# Patient Record
Sex: FEMALE | Race: WHITE | Employment: FULL TIME | URBAN - METROPOLITAN AREA
[De-identification: names, ages, dates, MRNs, and addresses within clinical notes are randomized per-mention and may not be internally consistent; named-entity substitution may affect disease eponyms.]

---

## 2017-09-20 ENCOUNTER — OFFICE VISIT (OUTPATIENT)
Dept: OPTOMETRY | Facility: CLINIC | Age: 57
End: 2017-09-20
Payer: COMMERCIAL

## 2017-09-20 DIAGNOSIS — H43.812 POSTERIOR VITREOUS DETACHMENT, LEFT EYE: Primary | ICD-10-CM

## 2017-09-20 PROCEDURE — 99202 OFFICE O/P NEW SF 15 MIN: CPT | Performed by: OPTOMETRIST

## 2017-09-20 ASSESSMENT — REFRACTION_MANIFEST
OS_AXIS: 155
METHOD_AUTOREFRACTION: 1
OD_SPHERE: +0.50
OD_CYLINDER: +0.25
OS_SPHERE: +0.50
OS_CYLINDER: +1.75
OD_AXIS: 026

## 2017-09-20 ASSESSMENT — SLIT LAMP EXAM - LIDS
COMMENTS: NORMAL
COMMENTS: NORMAL

## 2017-09-20 ASSESSMENT — VISUAL ACUITY
METHOD: SNELLEN - LINEAR
OS_SC: 20/100
OD_SC: 20/20
OD_SC+: -1
OS_PH_SC: 20/50

## 2017-09-20 ASSESSMENT — TONOMETRY
OD_IOP_MMHG: 12
OS_IOP_MMHG: 13
IOP_METHOD: APPLANATION

## 2017-09-20 ASSESSMENT — CUP TO DISC RATIO
OS_RATIO: 0.2
OD_RATIO: 0.2

## 2017-09-20 ASSESSMENT — EXTERNAL EXAM - RIGHT EYE: OD_EXAM: NORMAL

## 2017-09-20 ASSESSMENT — EXTERNAL EXAM - LEFT EYE: OS_EXAM: NORMAL

## 2017-09-20 NOTE — PATIENT INSTRUCTIONS
Signs of a retinal detachment include: a shower of dark spots, or  a shade or curtain coming across vision,  or flashing lights that seem to come and go through out the daylight.     If these occur call ASAP to make appointment to evaluate if there is a retinal detachment.    Return to clinic 1 year eye exam.              Melania Lizarraga O.D.  [facility]   [id number] [place] [address] [id number] [phone]

## 2017-09-20 NOTE — MR AVS SNAPSHOT
"              After Visit Summary   9/20/2017    Bambi Gillespie    MRN: [id number]           Patient Information     Date Of Birth          1960        Visit Information        Provider Department      9/20/2017 11:20 AM Melania Lizarraga, [facility]        Today's Diagnoses     Posterior vitreous detachment, left eye    -  1      Care Instructions    Signs of a retinal detachment include: a shower of dark spots, or  a shade or curtain coming across vision,  or flashing lights that seem to come and go through out the daylight.     If these occur call ASAP to make appointment to evaluate if there is a retinal detachment.    Return to clinic 1 year eye exam.              Melania Lizarraga O.D.  [facility]   [address] [place] [address]  [phone]              Follow-ups after your visit        Who to contact     If you have questions or need follow up information about today's clinic visit or your schedule please contact [facility] directly at [phone].  Normal or non-critical lab and imaging results will be communicated to you by [employer], letter or phone within 4 business days after the clinic has received the results. If you do not hear from us within 7 days, please contact the clinic through [employer] or phone. If you have a critical or abnormal lab result, we will notify you by phone as soon as possible.  Submit refill requests through "[employer] or call your pharmacy and they will forward the refill request to us. Please allow 3 business days for your refill to be completed.          Additional Information About Your Visit        [employer] Information     "[employer] lets you send messages to your doctor, view your test results, renew your prescriptions, schedule appointments and more. To sign up, go to [email or web address] . Click on \"Log in\" on the left side of the screen, which will take you to the Welcome page. Then click on \"Sign up Now\" on the right side " of the page.     You will be asked to enter the access code listed below, as well as some personal information. Please follow the directions to create your username and password.     Your access code is: [email or web address]  Expires: 2017  1:08 PM     Your access code will  in 90 days. If you need help or a new code, please call your [facility] clinic or [phone].        Care EveryWhere ID     This is your Care EveryWhere ID. This could be used by other organizations to access your [facility] medical records  [id number]         Blood Pressure from Last 3 Encounters:   16 109/73   16 118/76   10/22/14 97/67    Weight from Last 3 Encounters:   16 53.2 kg (117 lb 3.2 oz)   16 52.5 kg (115 lb 12.8 oz)   10/22/14 54.8 kg (120 lb 12.8 oz)              Today, you had the following     No orders found for display       Primary Care Provider Office Phone #    [facility] [phone]       No address on file        Equal Access to Services     RONALD BERRY : Hadii flaco ku hadhenrryo Soarabella, waaxda luqadaha, qaybta kaalmada eric, stefania bush . So [facility] [phone].    ATENCIÓN: Si habla español, tiene a coles disposición servicios gratuitos de asistencia lingüística. Llame al [phone].    We comply with applicable federal civil rights laws and [place] laws. We do not discriminate on the basis of race, color, national origin, age, disability sex, sexual orientation or gender identity.            Thank you!     Thank you for choosing [facility]  [facility]. Our goal is always to provide you with excellent care. Hearing back from our patients is one way we can continue to improve our services. Please take a few minutes to complete the written survey that you may receive in the mail after your visit with us. Thank you!             Your Updated Medication List - Protect others around you: Learn how to safely use, store and throw away your  medicines at www.disposemymeds.org.          This list is accurate as of: 9/20/17  1:08 PM.  Always use your most recent med list.                   Brand Name Dispense Instructions for use Diagnosis    LEXAPRO PO      Take 20 mg by mouth daily

## 2017-09-20 NOTE — PROGRESS NOTES
"Chief Complaint   Patient presents with     Eye Problem Left Eye     possible scratch     HPI    Affected eye(s):  Left   Symptoms:     Distorted vision      Duration:  5 days   Frequency:  Constant       Do you have eye pain now?:  No      Comments:  Patient said that she was doing some gardening when she was swarmed by gnats. She was rubbing and wiping at her eyes and it feels like she has a scratch on her eye.  She used allergy drops but it hasn't worked.  She denies pain, and said that it's just bothering her vision. No foreign body sensation  Patient also said that when it's dark she's noticed a \"string or scratch\" but it looks like a light.   She denied seeing any flashes of light.  Like eyelash blocking vision of left eye superiorly-  Firefly flashes when in dark room in temporal vision  Last eye exam 2 yrs ago  Has always avoided night driving due to vision                       HPI and ROS performed by Melania Lizarraga, OD  scribed by Rosa Maria Apple Optometric Assistant       See Review Of Systems     Medical, surgical and family histories reviewed and updated 9/20/2017.         OBJECTIVE: See Ophthalmology exam    ASSESSMENT:    ICD-10-CM    1. Posterior vitreous detachment, left eye H43.812       PLAN:  reassured retina healthy- need to monitor for changes  Patient Instructions   Signs of a retinal detachment include: a shower of dark spots, or  a shade or curtain coming across vision,  or flashing lights that seem to come and go through out the daylight.     If these occur call ASAP to make appointment to evaluate if there is a retinal detachment.    Return to clinic 1 year eye exam.              Melania Lizarraga O.D.  [facility]   [id number] [place] [id number]  [phone]         "

## 2018-01-10 ENCOUNTER — TRANSFERRED RECORDS (OUTPATIENT)
Dept: HEALTH INFORMATION MANAGEMENT | Facility: CLINIC | Age: 58
End: 2018-01-10

## 2018-01-21 ENCOUNTER — TRANSFERRED RECORDS (OUTPATIENT)
Dept: HEALTH INFORMATION MANAGEMENT | Facility: CLINIC | Age: 58
End: 2018-01-21

## 2018-12-31 ENCOUNTER — OFFICE VISIT (OUTPATIENT)
Dept: FAMILY MEDICINE | Facility: CLINIC | Age: 58
End: 2018-12-31
Payer: COMMERCIAL

## 2018-12-31 VITALS
SYSTOLIC BLOOD PRESSURE: 123 MMHG | WEIGHT: 112 LBS | BODY MASS INDEX: 18.66 KG/M2 | HEART RATE: 79 BPM | OXYGEN SATURATION: 96 % | RESPIRATION RATE: 16 BRPM | HEIGHT: 65 IN | TEMPERATURE: 98 F | DIASTOLIC BLOOD PRESSURE: 85 MMHG

## 2018-12-31 DIAGNOSIS — J01.90 ACUTE SINUSITIS WITH SYMPTOMS > 10 DAYS: Primary | ICD-10-CM

## 2018-12-31 PROCEDURE — 99213 OFFICE O/P EST LOW 20 MIN: CPT | Performed by: NURSE PRACTITIONER

## 2018-12-31 ASSESSMENT — MIFFLIN-ST. JEOR: SCORE: 1078.91

## 2018-12-31 ASSESSMENT — PAIN SCALES - GENERAL: PAINLEVEL: MODERATE PAIN (4)

## 2018-12-31 NOTE — PROGRESS NOTES
"  SUBJECTIVE:   Bambi Gillespie is a 58 year old female who presents to clinic today for the following health issues:      RESPIRATORY SYMPTOMS      Duration: 2 weeks    Description  nasal congestion, rhinorrhea, facial pain/pressure, cough, ear pain bilateral and headache    Severity: moderate    Accompanying signs and symptoms: None    History (predisposing factors):  none    Precipitating or alleviating factors: None    Therapies tried and outcome:  none      Problem list and histories reviewed & adjusted, as indicated.  Additional history: as documented    Patient Active Problem List   Diagnosis     Advanced directives, counseling/discussion     Posterior vitreous detachment, left eye     History reviewed. No pertinent surgical history.    Social History     Tobacco Use     Smoking status: Never Smoker     Smokeless tobacco: Never Used   Substance Use Topics     Alcohol use: Yes     Family History   Problem Relation Age of Onset     Glaucoma No family hx of      Macular Degeneration No family hx of            Reviewed and updated as needed this visit by clinical staff  Tobacco  Allergies  Meds  Med Hx  Surg Hx  Fam Hx  Soc Hx      Reviewed and updated as needed this visit by Provider         ROS:  CONSTITUTIONAL: NEGATIVE for fever, chills, change in weight  ENT/MOUTH: NEGATIVE for ear, mouth and throat problems  RESP: NEGATIVE for significant cough or SOB  CV: NEGATIVE for chest pain, palpitations or peripheral edema    OBJECTIVE:     /85   Pulse 79   Temp 98  F (36.7  C) (Oral)   Resp 16   Ht 1.651 m (5' 5\")   Wt 50.8 kg (112 lb)   SpO2 96%   Breastfeeding? No   BMI 18.64 kg/m    Body mass index is 18.64 kg/m .  GENERAL:alert and no distress  EYES: Eyes grossly normal to inspection, PERRL and conjunctivae and sclerae normal  HENT: ear canals and TM's normal, nose and mouth without ulcers or lesions, frontal tenderness  NECK: no adenopathy, no asymmetry, masses, or scars and thyroid normal to " palpation  RESP: lungs clear to auscultation - no rales, rhonchi or wheezes  CV: regular rate and rhythm, normal S1 S2, no S3 or S4, no murmur, click or rub, no peripheral edema and peripheral pulses strong  SKIN: no suspicious lesions or rashes      Diagnostic Test Results:  none     ASSESSMENT/PLAN:     1. Acute sinusitis with symptoms > 10 days    - amoxicillin-clavulanate (AUGMENTIN) 875-125 MG tablet; Take 1 tablet by mouth 2 times daily for 10 days  Dispense: 20 tablet; Refill: 0    See Patient Instructions    MADISON Fish [facility]

## 2019-01-07 ENCOUNTER — TELEPHONE (OUTPATIENT)
Dept: FAMILY MEDICINE | Facility: CLINIC | Age: 59
End: 2019-01-07

## 2019-01-07 NOTE — TELEPHONE ENCOUNTER
"Augmentin is typically a good medication for sinus infection  There is nothing \"stronger\" and zpaks are not recommended for sinus infections  If symptoms are same or worse she should be seen again in clinic    Meenakshi Irizarry, APRN CNP    "

## 2019-01-07 NOTE — TELEPHONE ENCOUNTER
Reason for call: Per patient: the medication(amoxicillin-clavulanate (AUGMENTIN) 875-125 MG tablet) isn't working, I've been taking it for 7 days and is it possible to order a z-tac or something a little stronger.    Pharmacy: [facility] the [facility] in [place]    Phone number to reach patient:  Cell number on file:    Telephone Information:   Mobile [phone]       Best Time:  Anytime    Can we leave a detailed message on this number?  YES

## 2019-01-09 ENCOUNTER — TELEPHONE (OUTPATIENT)
Dept: FAMILY MEDICINE | Facility: CLINIC | Age: 59
End: 2019-01-09

## 2019-01-09 NOTE — TELEPHONE ENCOUNTER
Patient was informed of Meenakshi Irizarry's message, see will finish her medication and decide if she wants to be seen tomorrow.  Jessi Adair MA

## 2019-01-09 NOTE — TELEPHONE ENCOUNTER
Patient is calling again regarding the medication that was given to her is not working.  Please call to advise.  Patient states that she has not received a call back from the previous message that she left on 1/7/2019.  Thank you

## 2019-02-11 ENCOUNTER — THERAPY VISIT (OUTPATIENT)
Dept: PHYSICAL THERAPY | Facility: CLINIC | Age: 59
End: 2019-02-11
Payer: COMMERCIAL

## 2019-02-11 DIAGNOSIS — M25.551 HIP PAIN, RIGHT: ICD-10-CM

## 2019-02-11 DIAGNOSIS — M25.552 HIP PAIN, LEFT: Primary | ICD-10-CM

## 2019-02-11 DIAGNOSIS — M67.959 TENDINOPATHY OF GLUTEAL REGION: ICD-10-CM

## 2019-02-11 PROCEDURE — 97161 PT EVAL LOW COMPLEX 20 MIN: CPT | Mod: GP | Performed by: PHYSICAL THERAPIST

## 2019-02-11 PROCEDURE — 97110 THERAPEUTIC EXERCISES: CPT | Mod: GP | Performed by: PHYSICAL THERAPIST

## 2019-02-11 NOTE — LETTER
[facility]  [address]  [place] [address]  [phone]    2019    Re: Bambi Gillespie   :   1960  MRN:  [id number]   REFERRING PHYSICIAN:   [facility]    Date of Initial Evaluation:  19  Visits:  Rxs Used: 1  Reason for Referral:     Hip pain, left  Hip pain, right  Tendinopathy of gluteal region    [facility] Initial Evaluation    Subjective:  The history is provided by the patient. No  was used.   Bambi Gillespie is a 58 year old female with a bilateral hips condition.  Condition occurred with:  Insidious onset and degenerative joint disease.  Condition occurred: for unknown reasons.  This is a chronic condition  19.    Patient reports pain:  Posterior.  Radiates to:  Gluteals.  Pain is described as aching and sharp and is intermittent and reported as 6/10.  Associated symptoms:  Loss of motion/stiffness and loss of strength. Pain is the same all the time.  Symptoms are exacerbated by ascending stairs, bending/squatting, descending stairs, running and walking and relieved by heat, ice and rest.  Since onset symptoms are unchanged.  Special tests:  X-ray and MRI.  Previous treatment includes physical therapy, chiropractic and other (massage ).  There was mild improvement following previous treatment.  General health as reported by patient is excellent.  Pertinent medical history includes:  None.  Medical allergies: no.  Other surgeries include:  None reported.  Current medications:  None as reported by the patient.  Current occupation is  .  Patient is working in normal job without restrictions.  Primary job tasks include:  Prolonged sitting, prolonged standing, repetitive tasks and lifting.  Barriers include:  None as reported by the patient.  Red flags:  None as reported by the patient.    Objective:  Standing Alignment:    Cervical/Thoracic:  Forward  head  Shoulder/UE:  Rounded shoulders    Gait:    Gait Type:  Antalgic   Weight Bearing Status:  WBAT   Assistive Devices:  None    Flexibility/Screens:   Positive screens:  Lumbar      Lower Extremity:  Decreased left lower extremity flexibility:Hip IR's; Hip ER's; Piriformis and Hip Flexors  Decreased right lower extremity flexibility:  Hip IR's; Hip ER's; Piriformis and Hip Flexors  Neurological: She is alert. She has normal strength and normal reflexes. No cranial nerve deficit or sensory deficit.     Hip Evaluation  Hip PROM:    Flexion: Left: 120   Right: 120  Internal Rotation: Left: 45    Right: 45  External Rotation: Left: 75    Right: 60    Hip Strength:  : weak and painful ER and IR bilaterally , core strength deminished as well as hip abductor strength     Hip Special Testing:    Left hip positive for the following special tests:  Kevin  Right hip positive for the following special tests:  Kevin      Hip Palpation:  Not Assessed     Assessment/Plan:    Patient is a 58 year old female with both sides hip complaints.    Patient has the following significant findings with corresponding treatment plan.                Diagnosis 1:   Bilateral Hip Pain   Pain -  hot/cold therapy  Decreased ROM/flexibility - manual therapy and therapeutic exercise  Decreased strength - therapeutic exercise and therapeutic activities    Therapy Evaluation Codes:   1) History comprised of:   Personal factors that impact the plan of care:      Time since onset of symptoms.    Comorbidity factors that impact the plan of care are:      None.     Medications impacting care: None.  2) Examination of Body Systems comprised of:   Body structures and functions that impact the plan of care:      Hip.   Activity limitations that impact the plan of care are:      Driving, Lifting, Sitting, Squatting/kneeling, Stairs and Walking.  3) Clinical presentation characteristics are:   Stable/Uncomplicated.  4) Decision-Making    Low complexity  using standardized patient assessment instrument and/or measureable assessment of functional outcome.    Cumulative Therapy Evaluation is: Low complexity.  Previous and current functional limitations:  (See Goal Flow Sheet for this information)    Short term and Long term goals: (See Goal Flow Sheet for this information)   Communication ability:  Patient appears to be able to clearly communicate and understand verbal and written communication and follow directions correctly.  Treatment Explanation - The following has been discussed with the patient:   RX ordered/plan of care  Anticipated outcomes  Possible risks and side effects  This patient would benefit from PT intervention to resume normal activities.   Rehab potential is good.    Frequency:  1 X week, once daily  Duration:  for 12 weeks  Discharge Plan:  Achieve all LTG.  Independent in home treatment program.  Reach maximal therapeutic benefit.    Thank you for your referral.    INQUIRIES  Therapist: Tom Kenney, PT, ATC, Cert. [facility]  [address] [place] [address]  Phone: [phone]  Fax: [phone]

## 2019-02-17 NOTE — PROGRESS NOTES
[facility] Initial Evaluation  Subjective:  The history is provided by the patient. No  was used.   Bambi Gillespie is a 58 year old female with a bilateral hips condition.  Condition occurred with:  Insidious onset and degenerative joint disease.  Condition occurred: for unknown reasons.  This is a chronic condition  1/19/19.    Patient reports pain:  Posterior.  Radiates to:  Gluteals.  Pain is described as aching and sharp and is intermittent and reported as 6/10.  Associated symptoms:  Loss of motion/stiffness and loss of strength. Pain is the same all the time.  Symptoms are exacerbated by ascending stairs, bending/squatting, descending stairs, running and walking and relieved by heat, ice and rest.  Since onset symptoms are unchanged.  Special tests:  X-ray and MRI.  Previous treatment includes physical therapy, chiropractic and other (massage ).  There was mild improvement following previous treatment.  General health as reported by patient is excellent.  Pertinent medical history includes:  None.  Medical allergies: no.  Other surgeries include:  None reported.  Current medications:  None as reported by the patient.  Current occupation is  .  Patient is working in normal job without restrictions.  Primary job tasks include:  Prolonged sitting, prolonged standing, repetitive tasks and lifting.    Barriers include:  None as reported by the patient.    Red flags:  None as reported by the patient.                        Objective:  Standing Alignment:    Cervical/Thoracic:  Forward head  Shoulder/UE:  Rounded shoulders              Gait:    Gait Type:  Antalgic   Weight Bearing Status:  WBAT   Assistive Devices:  None      Flexibility/Screens:   Positive screens:  Lumbar    Lower Extremity:  Decreased left lower extremity flexibility:Hip IR's; Hip ER's; Piriformis and Hip Flexors    Decreased right lower extremity flexibility:  Hip IR's; Hip ER's; Piriformis  and Hip Flexors      Neurological: She is alert. She has normal strength and normal reflexes. No cranial nerve deficit or sensory deficit.                                              Hip Evaluation  Hip PROM:    Flexion: Left: 120   Right: 120        Internal Rotation: Left: 45    Right: 45  External Rotation: Left: 75    Right: 60              Hip Strength:  : weak and painful ER and IR bilaterally , core strength deminished as well as hip abductor strength                           Hip Special Testing:    Left hip positive for the following special tests:  Kevin   Right hip positive for the following special tests:  Kevin    Hip Palpation:  Not Assessed                    General     ROS    Assessment/Plan:    Patient is a 58 year old female with both sides hip complaints.    Patient has the following significant findings with corresponding treatment plan.                Diagnosis 1:   Bilateral Hip Pain   Pain -  hot/cold therapy  Decreased ROM/flexibility - manual therapy and therapeutic exercise  Decreased strength - therapeutic exercise and therapeutic activities    Therapy Evaluation Codes:   1) History comprised of:   Personal factors that impact the plan of care:      Time since onset of symptoms.    Comorbidity factors that impact the plan of care are:      None.     Medications impacting care: None.  2) Examination of Body Systems comprised of:   Body structures and functions that impact the plan of care:      Hip.   Activity limitations that impact the plan of care are:      Driving, Lifting, Sitting, Squatting/kneeling, Stairs and Walking.  3) Clinical presentation characteristics are:   Stable/Uncomplicated.  4) Decision-Making    Low complexity using standardized patient assessment instrument and/or measureable assessment of functional outcome.  Cumulative Therapy Evaluation is: Low complexity.    Previous and current functional limitations:  (See Goal Flow Sheet for this information)    Short term  and Long term goals: (See Goal Flow Sheet for this information)     Communication ability:  Patient appears to be able to clearly communicate and understand verbal and written communication and follow directions correctly.  Treatment Explanation - The following has been discussed with the patient:   RX ordered/plan of care  Anticipated outcomes  Possible risks and side effects  This patient would benefit from PT intervention to resume normal activities.   Rehab potential is good.    Frequency:  1 X week, once daily  Duration:  for 12 weeks  Discharge Plan:  Achieve all LTG.  Independent in home treatment program.  Reach maximal therapeutic benefit.    Please refer to the daily flowsheet for treatment today, total treatment time and time spent performing 1:1 timed codes.

## 2019-02-25 ENCOUNTER — THERAPY VISIT (OUTPATIENT)
Dept: PHYSICAL THERAPY | Facility: CLINIC | Age: 59
End: 2019-02-25
Payer: COMMERCIAL

## 2019-02-25 DIAGNOSIS — M79.18 GLUTEAL PAIN: Primary | ICD-10-CM

## 2019-02-25 PROCEDURE — 97112 NEUROMUSCULAR REEDUCATION: CPT | Mod: GP | Performed by: PHYSICAL THERAPIST

## 2019-02-25 PROCEDURE — 97110 THERAPEUTIC EXERCISES: CPT | Mod: GP | Performed by: PHYSICAL THERAPIST

## 2019-03-29 ENCOUNTER — THERAPY VISIT (OUTPATIENT)
Dept: PHYSICAL THERAPY | Facility: CLINIC | Age: 59
End: 2019-03-29
Payer: COMMERCIAL

## 2019-03-29 PROCEDURE — 97110 THERAPEUTIC EXERCISES: CPT | Mod: GP | Performed by: PHYSICAL THERAPIST

## 2019-03-29 PROCEDURE — 97112 NEUROMUSCULAR REEDUCATION: CPT | Mod: GP | Performed by: PHYSICAL THERAPIST

## 2019-03-29 NOTE — LETTER
[facility]  [address]  [place] [address]  [phone]    2019    Re: Bambi Gillespie   :   1960  MRN:  [id number]   REFERRING PHYSICIAN:   [facility]    Date of Initial Evaluation:  19  Visits:  Rxs Used: 3  Reason for Referral:  Tendinopathy of gluteal region    PROGRESS  REPORT  Progress reporting period is from 2019 to 3/29/2019.     SUBJECTIVE  Bambi has been self managing hir bilateral gluteal pain for the past month. She has been able to completely resolve her R side, but L is troublesome yet, especially when sitting  Style, cross leg or deep squat position.    Current Pain level: 1/10   Initial Pain level: 6/10   Changes in function: Yes, see goal flow sheet for change in function   Adverse reactions: None    OBJECTIVE   HEP once again , modified , to meet the needs of the patient .   Bambi  has been advised to avoid provocative positions such as crosslegged sitting deep squats and  Style  sitting./Stretching.  She  is happy with the program at this point she is able to continue with her basic aerobic exercise strengthening and yoga without hurting.    Physical therapy intervention at this point has been leaning towards self-care program with follow-up every 3-4 weeks.  Anticipate this patient to be present within the next month.    She has not may be an indication to consider mobilization or deep tissue massage or perhaps reexamination of a right internal impingement type injury.  At this point goals are being met for most of the course and follow-up with her physician as planned.      ASSESSMENT/PLAN  Updated problem list and treatment plan: Diagnosis 1:  Bilateral gluteus minimus tendinopathy left greater than right.    STG/LTGs have been met or progress has been made towards goals:  Yes (See Goal flow sheet completed today.)  Assessment of Progress: The patient's condition is improving.  The patient's  condition has potential to improve.  Self Management Plans:  Patient has been instructed in a home treatment program.    Recommendations:  This patient would benefit from continued therapy.     Frequency:  1 X a month, once daily  Duration:  for 1 months    Thank you for your referral.    INQUIRIES  Therapist: Tom Kenney PT, ATC, Cert. MDT  [facility]  [address] [place] [address]  Phone: [phone]  Fax: [phone]

## 2019-03-31 NOTE — PROGRESS NOTES
Subjective:  Addendum 7/19/19: Bambi has been self managing since last PT appointment 3/29/19. No future appoints. Bambi will be discontinue from Physical Therapy.                           Objective:  System    Physical Exam    General     ROS    Assessment/Plan:    PROGRESS  REPORT    Progress reporting period is from 2/12/2019 to 3/29/2019.     SUBJECTIVE  : Bambi has been self managing hir bilateral gluteal pain for the past month. She has been able to completely resolve her R side, but L is troublesome yet, especially when sitting Gambian Style, cross leg or deep squat position.      Current Pain level: 1/10   Initial Pain level: 6/10   Changes in function: Yes, see goal flow sheet for change in function   Adverse reactions: None;   ,         OBJECTIVE   HEP once again , modified , to meet the needs of the patient .     Bambi  has been advised to avoid provocative positions such as crosslegged sitting deep squats and Gambian Style  sitting./Stretching.  She  is happy with the program at this point she is able to continue with her basic aerobic exercise strengthening and yoga without hurting.      Physical therapy intervention at this point has been leaning towards self-care program with follow-up every 3-4 weeks.  Anticipate this patient to be present within the next month.      She has not may be an indication to consider mobilization or deep tissue massage or perhaps reexamination of a right internal impingement type injury.  At this point goals are being met for most of the course and follow-up with her physician as planned.      ASSESSMENT/PLAN  Updated problem list and treatment plan: Diagnosis 1:  Bilateral gluteus minimus tendinopathy left greater than right.    STG/LTGs have been met or progress has been made towards goals:  Yes (See Goal flow sheet completed today.)  Assessment of Progress: The patient's condition is improving.  The patient's condition has potential to improve.  Self Management Plans:   Patient has been instructed in a home treatment program.      Recommendations:  This patient would benefit from continued therapy.     Frequency:  1 X a month, once daily  Duration:  for 1 months        Please refer to the daily flowsheet for treatment today, total treatment time and time spent performing 1:1 timed codes.

## 2019-06-03 ENCOUNTER — TELEPHONE (OUTPATIENT)
Dept: FAMILY MEDICINE | Facility: CLINIC | Age: 59
End: 2019-06-03

## 2021-10-12 ENCOUNTER — THERAPY VISIT (OUTPATIENT)
Dept: PHYSICAL THERAPY | Facility: CLINIC | Age: 61
End: 2021-10-12
Payer: COMMERCIAL

## 2021-10-12 DIAGNOSIS — M25.552 HIP PAIN, LEFT: ICD-10-CM

## 2021-10-12 PROCEDURE — 97110 THERAPEUTIC EXERCISES: CPT | Mod: GP | Performed by: PHYSICAL THERAPIST

## 2021-10-12 PROCEDURE — 97161 PT EVAL LOW COMPLEX 20 MIN: CPT | Mod: GP | Performed by: PHYSICAL THERAPIST

## 2021-10-12 ASSESSMENT — ACTIVITIES OF DAILY LIVING (ADL)
ROLLING_OVER_IN_BED: MODERATE DIFFICULTY
WALKING_UP_STEEP_HILLS: MODERATE DIFFICULTY
HEAVY_WORK: MODERATE DIFFICULTY
SITTING_FOR_15_MINUTES: NO DIFFICULTY AT ALL
PUTTING_ON_SOCKS_AND_SHOES: NO DIFFICULTY AT ALL
RECREATIONAL_ACTIVITIES: UNABLE TO DO
WALKING_15_MINUTES_OR_GREATER: MODERATE DIFFICULTY
HOW_WOULD_YOU_RATE_YOUR_CURRENT_LEVEL_OF_FUNCTION_DURING_YOUR_USUAL_ACTIVITIES_OF_DAILY_LIVING_FROM_0_TO_100_WITH_100_BEING_YOUR_LEVEL_OF_FUNCTION_PRIOR_TO_YOUR_HIP_PROBLEM_AND_0_BEING_THE_INABILITY_TO_PERFORM_ANY_OF_YOUR_USUAL_DAILY_ACTIVITIES?: 0
HOS_ADL_COUNT: 16
HOS_ADL_ITEM_SCORE_TOTAL: 38
GETTING_INTO_AND_OUT_OF_A_BATHTUB: MODERATE DIFFICULTY
DEEP_SQUATTING: MODERATE DIFFICULTY
GETTING_INTO_AND_OUT_OF_AN_AVERAGE_CAR: SLIGHT DIFFICULTY
TWISTING/PIVOTING_ON_INVOLVED_LEG: EXTREME DIFFICULTY
HOS_ADL_HIGHEST_POTENTIAL_SCORE: 64
STANDING_FOR_15_MINUTES: NO DIFFICULTY AT ALL
WALKING_INITIALLY: NO DIFFICULTY AT ALL
STEPPING_UP_AND_DOWN_CURBS: NO DIFFICULTY AT ALL
GOING_UP_1_FLIGHT_OF_STAIRS: MODERATE DIFFICULTY
LIGHT_TO_MODERATE_WORK: NO DIFFICULTY AT ALL
WALKING_DOWN_STEEP_HILLS: MODERATE DIFFICULTY
GOING_DOWN_1_FLIGHT_OF_STAIRS: MODERATE DIFFICULTY
HOS_ADL_SCORE(%): 59.38

## 2021-10-12 NOTE — PROGRESS NOTES
Physical Therapy Initial Evaluation  Subjective:  Bambi Gillespie is a 61 year old female with a lumbar and left hip condition.    The condition occurred due to repetition/overuse.  The condition occurred during recreation/sport.  This is a new condition.    Mechanism/History of injury/symptoms:  9/25/21 patient developed left posterior hip pain immediately after playing pickleball and walking to her car.  The pain is located posterior and the quality of pain is reported as sharp or aching.  The degree of pain is reported as 5/10 or more. The timing of pain/symptoms is intermittent, not dependent on time of day. Associated symptoms include: weakness, tightness    Symptoms are exacerbated by: lying on left side, bending, twisting, waklking.  Symptoms are relieved by: rest, ice, ibuprofen.  Since onset symptoms are unchanged.    Special tests for this condition include: none reported.  Previous treatments for this condition include: none.    General health as reported by patient is excellent.  Pertinent medical history includes:  None.  Medical allergies: no.  Other surgeries include:  restorative colectomy.  Current medications:  None as reported by the patient.  Current occupation is retired.  Primary home tasks include:  gardening.     Barriers include:  None as reported by the patient.     Red flags:  None as reported by the patient  Previous level of function: regular yoga without pain, playing pickleball without pain, no pain sleeping on left side  Current functional restrictions:  Left hip pain with gardening activities, sleeping on left side, unable to do yoga or play pickleball due to pain    HPI                    Objective:  LUMBAR:    Posture: fair  Posture Correction: no effect  Relevant Lateral Shift: no    Neurological:    Motor Deficit: myotomes WNL  Sensory Deficit, Reflexes: WNL    AROM: (Major, Moderate, Minimal or Nil loss)  Movement Loss Philippe Mod Min Nil Pain   Flexion    x Proximal hamstring pain    Extension    x    Side Gliding L    x    Side Gliding R    x      Repeated movement testing:   (During: produces, abolishes, increases, decreases, no effect, centralizing, peripheralizing; After: better, worse, no better, no worse, no effect, centralized, peripheralized)    HIP:    PROM:   L  R   Flexion Pain at 100 120   Extension 20 20   Abduction nt nt   IR Pain at 15 30   ER Pain at 20 60     Strength:   L R   HIP     Flex 5/5 5/5   Ext 4-/5 5/5   Abd 4-/5 with pain 5/5   KNEE     Flex 4+/5 5/5   Ext 5/5 5/5     Special tests:   L R   Linnette's + -   Surinder - -   HILARIA + -   FADIR + -       Palpation: left hip tender to palpation at piriformis and proximal hamstring tendon    Functional: pain in left proximal hamstring with double leg squat to near 90 degrees knee flexion.  No pain with single leg balance or single leg mini-squat to 15 degrees on either leg    Gait: grossly WNL            System    Physical Exam    General     ROS    Assessment/Plan:    Patient is a 61 year old female with lumbar and left side hip complaints.    Patient has the following significant findings with corresponding treatment plan.                Diagnosis 1:  Low back pain    Pain -  hot/cold therapy, manual therapy, self management, education, directional preference exercise and home program  Decreased ROM/flexibility - manual therapy, therapeutic exercise and home program  Decreased strength - therapeutic exercise, therapeutic activities and home program  Decreased function - therapeutic activities and home program  Diagnosis 2:  Left hip pain     Pain -  hot/cold therapy, US, manual therapy, self management, education and home program  Decreased ROM/flexibility - manual therapy, therapeutic exercise and home program  Decreased strength - therapeutic exercise, therapeutic activities and home program  Decreased proprioception - neuro re-education, therapeutic activities and home program  Decreased function - therapeutic activities  and home program    Therapy Evaluation Codes:   1) History comprised of:   Personal factors that impact the plan of care:      Time since onset of symptoms.    Comorbidity factors that impact the plan of care are:      None.     Medications impacting care: None.  2) Examination of Body Systems comprised of:   Body structures and functions that impact the plan of care:      Hip and Lumbar spine.   Activity limitations that impact the plan of care are:      Bending, Dressing, Lifting, Sports, Squatting/kneeling, Walking and Sleeping.  3) Clinical presentation characteristics are:   Stable/Uncomplicated.  4) Decision-Making    Low complexity using standardized patient assessment instrument and/or measureable assessment of functional outcome.  Cumulative Therapy Evaluation is: Low complexity.    Previous and current functional limitations:  (See Goal Flow Sheet for this information)    Short term and Long term goals: (See Goal Flow Sheet for this information)     Communication ability:  Patient appears to be able to clearly communicate and understand verbal and written communication and follow directions correctly.  Treatment Explanation - The following has been discussed with the patient:   RX ordered/plan of care  Anticipated outcomes  Possible risks and side effects  This patient would benefit from PT intervention to resume normal activities.   Rehab potential is good.    Frequency:  1 X week, once daily  Duration:  for 8 weeks  Discharge Plan:  Achieve all LTG.  Independent in home treatment program.  Reach maximal therapeutic benefit.    Please refer to the daily flowsheet for treatment today, total treatment time and time spent performing 1:1 timed codes.

## 2021-10-12 NOTE — LETTER
[facility]  [address]  [place] [address]  [phone]    2021    Re: Bambi Gillespie   :   1960  MRN:  [id number]   REFERRING PHYSICIAN:   Edda MEDINA [facility]    Date of Initial Evaluation:  10/11/21  Visits:  Rxs Used: 1  Reason for Referral:  Hip pain, left    Physical Therapy Initial Evaluation    Subjective:  Bambi Gillespie is a 61 year old female with a lumbar and left hip condition.    The condition occurred due to repetition/overuse.  The condition occurred during recreation/sport.  This is a new condition.  Mechanism/History of injury/symptoms:  21 patient developed left posterior hip pain immediately after playing pickleball and walking to her car.  The pain is located posterior and the quality of pain is reported as sharp or aching.  The degree of pain is reported as 5/10 or more. The timing of pain/symptoms is intermittent, not dependent on time of day. Associated symptoms include: weakness, tightness  Symptoms are exacerbated by: lying on left side, bending, twisting, waklking.  Symptoms are relieved by: rest, ice, ibuprofen.  Since onset symptoms are unchanged.  Special tests for this condition include: none reported.  Previous treatments for this condition include: none.  General health as reported by patient is excellent.  Pertinent medical history includes:  None.  Medical allergies: no.  Other surgeries include:  restorative colectomy.  Current medications:  None as reported by the patient.  Current occupation is retired.  Primary home tasks include:  gardening.     Barriers include:  None as reported by the patient.     Red flags:  None as reported by the patient  Previous level of function: regular yoga without pain, playing pickleball without pain, no pain sleeping on left side  Current functional restrictions:  Left hip pain with gardening activities, sleeping on left  side, unable to do yoga or play pickleball due to pain          Objective:  LUMBAR:    Posture: fair  Posture Correction: no effect  Relevant Lateral Shift: no    Neurological:    Motor Deficit: myotomes WNL  Sensory Deficit, Reflexes: WNL    AROM: (Major, Moderate, Minimal or Nil loss)  Movement Loss Philippe Mod Min Nil Pain   Flexion    x Proximal hamstring pain   Extension    x    Side Gliding L    x    Side Gliding R    x      Repeated movement testing:   (During: produces, abolishes, increases, decreases, no effect, centralizing, peripheralizing; After: better, worse, no better, no worse, no effect, centralized, peripheralized)    HIP:    PROM:   L  R   Flexion Pain at 100 120   Extension 20 20   Abduction nt nt   IR Pain at 15 30   ER Pain at 20 60     Strength:   L R   HIP     Flex 5/5 5/5   Ext 4-/5 5/5   Abd 4-/5 with pain 5/5   KNEE     Flex 4+/5 5/5   Ext 5/5 5/5     Special tests:   L R   Linnette's + -   Surinder - -   HILARIA + -   FADIR + -     Palpation: left hip tender to palpation at piriformis and proximal hamstring tendon    Functional: pain in left proximal hamstring with double leg squat to near 90 degrees knee flexion.  No pain with single leg balance or single leg mini-squat to 15 degrees on either leg    Gait: grossly WNL    Assessment/Plan:    Patient is a 61 year old female with lumbar and left side hip complaints.    Patient has the following significant findings with corresponding treatment plan.                Diagnosis 1:  Low back pain    Pain -  hot/cold therapy, manual therapy, self management, education, directional preference exercise and home program  Decreased ROM/flexibility - manual therapy, therapeutic exercise and home program  Decreased strength - therapeutic exercise, therapeutic activities and home program  Decreased function - therapeutic activities and home program  Diagnosis 2:  Left hip pain     Pain -  hot/cold therapy, US, manual therapy, self management, education and home  program  Decreased ROM/flexibility - manual therapy, therapeutic exercise and home program  Decreased strength - therapeutic exercise, therapeutic activities and home program  Decreased proprioception - neuro re-education, therapeutic activities and home program  Decreased function - therapeutic activities and home program    Therapy Evaluation Codes:   1) History comprised of:   Personal factors that impact the plan of care:      Time since onset of symptoms.    Comorbidity factors that impact the plan of care are:      None.     Medications impacting care: None.  2) Examination of Body Systems comprised of:   Body structures and functions that impact the plan of care:      Hip and Lumbar spine.   Activity limitations that impact the plan of care are:      Bending, Dressing, Lifting, Sports, Squatting/kneeling, Walking and Sleeping.  3) Clinical presentation characteristics are:   Stable/Uncomplicated.  4) Decision-Making    Low complexity using standardized patient assessment instrument and/or measureable assessment of functional outcome.  Cumulative Therapy Evaluation is: Low complexity.  Previous and current functional limitations:  (See Goal Flow Sheet for this information)    Short term and Long term goals: (See Goal Flow Sheet for this information)   Communication ability:  Patient appears to be able to clearly communicate and understand verbal and written communication and follow directions correctly.  Treatment Explanation - The following has been discussed with the patient:   RX ordered/plan of care  Anticipated outcomes  Possible risks and side effects  This patient would benefit from PT intervention to resume normal activities.   Rehab potential is good.    Frequency:  1 X week, once daily  Duration:  for 8 weeks  Discharge Plan:  Achieve all LTG.  Independent in home treatment program.  Reach maximal therapeutic benefit.    Thank you for your referral.    INQUIRIES  Therapist: Sharif Moise, PT, DPT,  [facility] SERVICES [place] [facility]  [address] [place] [address]  Phone: [phone]  Fax: [phone]

## 2021-10-27 ENCOUNTER — THERAPY VISIT (OUTPATIENT)
Dept: PHYSICAL THERAPY | Facility: CLINIC | Age: 61
End: 2021-10-27
Payer: COMMERCIAL

## 2021-10-27 DIAGNOSIS — M25.552 HIP PAIN, LEFT: ICD-10-CM

## 2021-10-27 PROCEDURE — 97110 THERAPEUTIC EXERCISES: CPT | Mod: GP | Performed by: PHYSICAL THERAPIST

## 2021-11-05 ENCOUNTER — THERAPY VISIT (OUTPATIENT)
Dept: PHYSICAL THERAPY | Facility: CLINIC | Age: 61
End: 2021-11-05
Payer: COMMERCIAL

## 2021-11-05 DIAGNOSIS — M25.552 HIP PAIN, LEFT: ICD-10-CM

## 2021-11-05 PROCEDURE — 97110 THERAPEUTIC EXERCISES: CPT | Mod: GP | Performed by: PHYSICAL THERAPIST

## 2021-11-05 PROCEDURE — 97140 MANUAL THERAPY 1/> REGIONS: CPT | Mod: GP | Performed by: PHYSICAL THERAPIST

## 2022-01-18 PROBLEM — M25.552 HIP PAIN, LEFT: Status: RESOLVED | Noted: 2021-10-12 | Resolved: 2022-01-18

## 2022-01-18 NOTE — PROGRESS NOTES
Patient is discharged from PT.  Please refer to SOAP note dated 11/5/21 for last known functional status as well as final objective/subjective values.

## 2025-02-11 ENCOUNTER — THERAPY VISIT (OUTPATIENT)
Dept: PHYSICAL THERAPY | Facility: CLINIC | Age: 65
End: 2025-02-11
Attending: FAMILY MEDICINE
Payer: COMMERCIAL

## 2025-02-11 DIAGNOSIS — M54.50 LOW BACK PAIN, UNSPECIFIED BACK PAIN LATERALITY, UNSPECIFIED CHRONICITY, UNSPECIFIED WHETHER SCIATICA PRESENT: Primary | ICD-10-CM

## 2025-02-11 PROCEDURE — 97110 THERAPEUTIC EXERCISES: CPT | Mod: GP | Performed by: PHYSICAL THERAPIST

## 2025-02-11 PROCEDURE — 97161 PT EVAL LOW COMPLEX 20 MIN: CPT | Mod: GP | Performed by: PHYSICAL THERAPIST

## 2025-02-11 NOTE — PROGRESS NOTES
PHYSICAL THERAPY EVALUATION  Type of Visit: Evaluation    See electronic medical record for Abuse and Falls Screening details.        Subjective   Condition type:  Chronic (continuous duration <3 months)  Cause of current episode:  Repetitive     Nature of treatment:  Rehabilitative  Functional ability:  Moderate functional limitations  Documented POC (choose all that apply):  Measurable short and long term/discharge treatment goals related to physical and functional deficits.;Frequency of treatment visits and treatment activities to address deficit areas.;Patient agrees to program participation including home program  Briefly describe symptoms:  R buttock and back pain. Painful transfers and difficulty carrying out ADLS such as lifting as a result.  How did the symptoms start:  After gardening over the summer  Average pain/intensity last 24 hours:  6/10  Average pain/intensity past week:  6/10  Frequency of Symptoms:  Occasionally (26-50% of the time)  Symptom impact on ADLs:  A little bit  Condition change since eval:  N/A (first visit)  General health reported by patient:  Very good    REFERRAL DX: Low back pain, unspecified back pain laterality, unspecified chronicity, unspecified whether sciatica present ; R    SUBJECTIVE HISTORY: [facility]. May have injured it October while doing some lifting/yard work. May have overdone it. R sided back and buttock pain. Unsure of numbness/tingling but acknowledges tightness, stabbing.  No bladder/bowel issues, saddle paresthesia, no night pain or fevers. Have not been to the gym in about 3 months, due to fear of not being able to participate/tolerate classes. Prior to the incident have been going to the gym consistently.       PAIN:  At best: 6/10  At worst: 9/10   Frequency: activity specific  Quality: sharp   Progression:  improved  Exacerbated by: sit to stand transfers   Eased by: ibuprofen, tylenol        Objective         Presenting condition or  subjective complaint:    Date of onset: 01/27/25    Relevant medical history:     Dates & types of surgery:      Prior diagnostic imaging/testing results:       T12-L1: Facet arthropathy. No significant canal or foraminal narrowing.   L1-2: Mild disc bulge. Facet arthropathy. No significant canal or foraminal narrowing.   L2-3: Mild disc bulge. Facet arthropathy. No significant canal or foraminal narrowing.   L3-4: Mild disc bulge. Facet arthropathy. No significant canal or foraminal narrowing.   L4-5: Mild disc bulge. Facet arthropathy. No significant canal stenosis. Mild bilateral foraminal narrowing.    L5-S1: Mild disc bulge. Facet arthropathy. No significant canal stenosis. Minimal right foraminal narrowing. Mild left foraminal narrowing.   Prior therapy history for the same diagnosis, illness or injury:        LUMBAR SPINE OBJECTIVE  ROM:   (Degrees) Left AROM Left PROM  Right AROM Right PROM   Hip Flexion       Hip Extension       Hip Abduction       Hip Internal Rotation   painful    Hip External Rotation       Lumbar Side glide Knee joint Knee joint, painful back   Lumbar Flexion Mid shin, increased back pain   Lumbar Extension WNL     STRENGTH:   Pain: - none + mild ++ moderate +++ severe  Strength Scale: 0-5/5 Left Right   Hip Flexion 5 5   Hip Extension NT NT   Hip Abduction Painful Painful      Hip Internal Rotation 5 4+, painful   Hip External Rotation 5 4+   Knee Flexion 5 5   Knee Extension 5 5     LUMBAR/HIP Special Tests:    Left Right   HILARIA Negative  Positive   FADIR/Labrum/JUAN DIEGO Negative  Positive   Piriformis Positive Positive   SLR Negative  Positive      PELVIS/SI SPECIAL TESTS:    Left Right Additional Notes   ASLR      Gaenslen's Test      Pelvic Compression - -    Pelvic Gapping - -    Sacral Thrust      Thigh Thrust        PALPATION:   + Tenderness At Location Left Right   Quadratus Lumborum + +   Erector Spinae + +   Piriformis  + +   PSIS - -   ASIS - -   Iliac Crest - -   Glut Medius +  +     MYOTOMES: WNL  DERMATOMES: WNL  NEURAL TENSION:  + SLR    Assessment & Plan   CLINICAL IMPRESSIONS  Medical Diagnosis: Low back pain, unspecified back pain laterality, unspecified chronicity, unspecified whether sciatica present    Treatment Diagnosis: Low back pain, unspecified back pain laterality, unspecified chronicity, unspecified whether sciatica present   Impression/Assessment: Patient is a 64 year old female with low back pain, R>L hip complaints.  The following significant findings have been identified: Pain, Decreased strength, Impaired muscle performance, and Decreased activity tolerance. These impairments interfere with their ability to perform recreational activities, household chores, household mobility, and community mobility as compared to previous level of function.     Clinical Decision Making (Complexity):  Clinical Presentation: Stable/Uncomplicated  Clinical Presentation Rationale: based on medical and personal factors listed in PT evaluation  Clinical Decision Making (Complexity): Low complexity    PLAN OF CARE  Treatment Interventions:  Modalities: Cryotherapy, Cupping, Dry Needling, E-stim, Hot Pack  Interventions: Gait Training, Manual Therapy, Neuromuscular Re-education, Therapeutic Activity, Therapeutic Exercise, Self-Care/Home Management    Long Term Goals     PT Goal 1  Goal Description: Patient will demo painfree lumbar AROM in all planes  Rationale: to maximize safety and independence with performance of ADLs and functional tasks;to maximize safety and independence within the community;to maximize safety and independence within the home  Target Date: 05/06/25  PT Goal 2  Goal Description: 10% improvement on NETTE  Rationale: to maximize safety and independence with performance of ADLs and functional tasks;to maximize safety and independence within the home;to maximize safety and independence within the community  Target Date: 05/06/25  PT Goal 3  Goal Description: Patient will  tolerate lifting >20# from the floor without increase in LBP  Rationale: to maximize safety and independence with performance of ADLs and functional tasks;to maximize safety and independence within the community;to maximize safety and independence within the home  Target Date: 05/06/25      Frequency of Treatment: 1x/week then biweekly  Duration of Treatment: 8-12 weeks    Education Assessment:   Learner/Method: Patient  Education Comments: PTRx    Risks and benefits of evaluation/treatment have been explained.   Patient/Family/caregiver agrees with Plan of Care.     Evaluation Time:     PT Eval, Low Complexity Minutes (57906): 15       Signing Clinician: NANCY Pillai [facility]                                                                                   OUTPATIENT PHYSICAL THERAPY      PLAN OF TREATMENT FOR OUTPATIENT REHABILITATION   Patient's Last Name, First Name, ADAMMargaritoLAWRENCEMargarito SahnidiegoBambi  C YOB: 1960   Provider's Name   [facility]   Medical Record No.  [id number]     Onset Date: 01/27/25  Start of Care Date: 02/11/25     Medical Diagnosis:  Low back pain, unspecified back pain laterality, unspecified chronicity, unspecified whether sciatica present      PT Treatment Diagnosis:  Low back pain, unspecified back pain laterality, unspecified chronicity, unspecified whether sciatica present Plan of Treatment  Frequency/Duration: 1x/week then biweekly/ 8-12 weeks    Certification date from 02/11/25 to 05/06/25         See note for plan of treatment details and functional goals     Elaine Augustin, PT                         I CERTIFY THE NEED FOR THESE SERVICES FURNISHED UNDER        THIS PLAN OF TREATMENT AND WHILE UNDER MY CARE .             Physician Signature               Date    X_____________________________________________________                  Referring Provider:  Milton Parker    Initial Assessment  See Epic Evaluation-  Start of Care Date: 02/11/25

## 2025-02-18 ENCOUNTER — THERAPY VISIT (OUTPATIENT)
Dept: PHYSICAL THERAPY | Facility: CLINIC | Age: 65
End: 2025-02-18
Payer: COMMERCIAL

## 2025-02-18 DIAGNOSIS — M54.50 LOW BACK PAIN, UNSPECIFIED BACK PAIN LATERALITY, UNSPECIFIED CHRONICITY, UNSPECIFIED WHETHER SCIATICA PRESENT: Primary | ICD-10-CM

## 2025-02-18 PROCEDURE — 97140 MANUAL THERAPY 1/> REGIONS: CPT | Mod: GP | Performed by: PHYSICAL THERAPIST

## 2025-02-18 PROCEDURE — 97110 THERAPEUTIC EXERCISES: CPT | Mod: GP | Performed by: PHYSICAL THERAPIST

## 2025-02-27 ENCOUNTER — THERAPY VISIT (OUTPATIENT)
Dept: PHYSICAL THERAPY | Facility: CLINIC | Age: 65
End: 2025-02-27
Payer: COMMERCIAL

## 2025-02-27 DIAGNOSIS — M54.50 LOW BACK PAIN, UNSPECIFIED BACK PAIN LATERALITY, UNSPECIFIED CHRONICITY, UNSPECIFIED WHETHER SCIATICA PRESENT: Primary | ICD-10-CM

## 2025-03-04 ENCOUNTER — THERAPY VISIT (OUTPATIENT)
Dept: PHYSICAL THERAPY | Facility: CLINIC | Age: 65
End: 2025-03-04
Payer: COMMERCIAL

## 2025-03-04 DIAGNOSIS — M54.50 LOW BACK PAIN, UNSPECIFIED BACK PAIN LATERALITY, UNSPECIFIED CHRONICITY, UNSPECIFIED WHETHER SCIATICA PRESENT: Primary | ICD-10-CM

## 2025-03-04 PROCEDURE — 97140 MANUAL THERAPY 1/> REGIONS: CPT | Mod: GP | Performed by: PHYSICAL THERAPIST

## 2025-03-04 PROCEDURE — 97110 THERAPEUTIC EXERCISES: CPT | Mod: GP | Performed by: PHYSICAL THERAPIST

## 2025-03-10 ENCOUNTER — THERAPY VISIT (OUTPATIENT)
Dept: PHYSICAL THERAPY | Facility: CLINIC | Age: 65
End: 2025-03-10
Payer: COMMERCIAL

## 2025-03-10 DIAGNOSIS — M54.50 LOW BACK PAIN, UNSPECIFIED BACK PAIN LATERALITY, UNSPECIFIED CHRONICITY, UNSPECIFIED WHETHER SCIATICA PRESENT: Primary | ICD-10-CM

## 2025-03-10 PROCEDURE — 97140 MANUAL THERAPY 1/> REGIONS: CPT | Mod: GP | Performed by: PHYSICAL THERAPIST

## 2025-03-10 PROCEDURE — 97110 THERAPEUTIC EXERCISES: CPT | Mod: GP | Performed by: PHYSICAL THERAPIST

## 2025-03-20 ENCOUNTER — THERAPY VISIT (OUTPATIENT)
Dept: PHYSICAL THERAPY | Facility: CLINIC | Age: 65
End: 2025-03-20
Payer: COMMERCIAL

## 2025-03-20 DIAGNOSIS — M54.50 LOW BACK PAIN, UNSPECIFIED BACK PAIN LATERALITY, UNSPECIFIED CHRONICITY, UNSPECIFIED WHETHER SCIATICA PRESENT: Primary | ICD-10-CM

## 2025-03-27 ENCOUNTER — THERAPY VISIT (OUTPATIENT)
Dept: PHYSICAL THERAPY | Facility: CLINIC | Age: 65
End: 2025-03-27
Payer: COMMERCIAL

## 2025-03-27 DIAGNOSIS — M54.50 LOW BACK PAIN, UNSPECIFIED BACK PAIN LATERALITY, UNSPECIFIED CHRONICITY, UNSPECIFIED WHETHER SCIATICA PRESENT: Primary | ICD-10-CM

## 2025-04-14 ENCOUNTER — THERAPY VISIT (OUTPATIENT)
Dept: PHYSICAL THERAPY | Facility: CLINIC | Age: 65
End: 2025-04-14
Payer: COMMERCIAL

## 2025-04-14 DIAGNOSIS — M54.50 LOW BACK PAIN, UNSPECIFIED BACK PAIN LATERALITY, UNSPECIFIED CHRONICITY, UNSPECIFIED WHETHER SCIATICA PRESENT: Primary | ICD-10-CM

## 2025-04-14 PROCEDURE — 20560 NDL INSJ W/O NJX 1 OR 2 MUSC: CPT | Mod: GZ | Performed by: PHYSICAL THERAPIST

## 2025-04-14 PROCEDURE — 97110 THERAPEUTIC EXERCISES: CPT | Mod: GP | Performed by: PHYSICAL THERAPIST

## 2025-05-01 ENCOUNTER — THERAPY VISIT (OUTPATIENT)
Dept: PHYSICAL THERAPY | Facility: CLINIC | Age: 65
End: 2025-05-01
Payer: COMMERCIAL

## 2025-05-01 DIAGNOSIS — M54.50 LOW BACK PAIN, UNSPECIFIED BACK PAIN LATERALITY, UNSPECIFIED CHRONICITY, UNSPECIFIED WHETHER SCIATICA PRESENT: Primary | ICD-10-CM

## 2025-05-15 ENCOUNTER — THERAPY VISIT (OUTPATIENT)
Dept: PHYSICAL THERAPY | Facility: CLINIC | Age: 65
End: 2025-05-15
Payer: COMMERCIAL

## 2025-05-15 DIAGNOSIS — M54.50 LOW BACK PAIN, UNSPECIFIED BACK PAIN LATERALITY, UNSPECIFIED CHRONICITY, UNSPECIFIED WHETHER SCIATICA PRESENT: Primary | ICD-10-CM

## 2025-05-15 NOTE — PROGRESS NOTES
05/15/25 0500   Appointment Info   Signing clinician's name / credentials Ealine Augustin, GONSALO, O[facility]   Total/Authorized Visits 12 + 8   Visits Used 11   Medical Diagnosis Low back pain, unspecified back pain laterality, unspecified chronicity, unspecified whether sciatica present   PT Tx Diagnosis Low back pain, unspecified back pain laterality, unspecified chronicity, unspecified whether sciatica present   Progress Note/Certification   Start of Care Date 02/11/25   Onset of illness/injury or Date of Surgery 01/27/25   Therapy Frequency biweekly or once months   Predicted Duration 12 weeks   Certification date from 05/15/25   Certification date to 08/07/25   Progress Note Due Date 07/13/25   Progress Note Completed Date 02/11/25   [facility] Authorization Information   Condition type Chronic (continuous duration <3 months)   Cause of current episode Repetitive   Nature of treatment Rehabilitative   Functional ability Minimal functional limitations   Documented POC (choose all that apply) Measurable short and long term/discharge treatment goals related to physical and functional deficits.;Frequency of treatment visits and treatment activities to address deficit areas.;Patient agrees to program participation including home program   Briefly describe symptoms soreness   How did the symptoms start After gardening over the summer   Last 24H: avg pain/symptom intensity  6/10   Past wk: avg pain/symptom intensity 6/10   Frequency of Symptoms Occasionally (26-50% of the time)   Symptom impact on ADLs A little bit   Condition change since eval Better   General health reported by patient Very good   GOALS   PT Goals 2;3   PT Goal 1   Goal Description Patient will demo painfree lumbar AROM in all planes   Rationale to maximize safety and independence with performance of ADLs and functional tasks;to maximize safety and independence within the community;to maximize safety and independence within the home   Goal Progress mild soreness  with flexion but no pain   Target Date 05/06/25   Date Met 05/15/25   PT Goal 2   Goal Description 10% improvement on NETTE   Rationale to maximize safety and independence with performance of ADLs and functional tasks;to maximize safety and independence within the home;to maximize safety and independence within the community   Target Date 08/07/25   PT Goal 3   Goal Description Patient will tolerate lifting >20# from the floor without increase in LBP   Rationale to maximize safety and independence with performance of ADLs and functional tasks;to maximize safety and independence within the community;to maximize safety and independence within the home   Goal Progress able to lift 20# potter plants without increase in pain   Target Date 05/06/25   Date Met 05/15/25   Subjective Report   Subjective Report Things have been actually feeling better. Had master gardening plant sale- two full days of lifting/moving plants so just sore.   Objective Measures   Objective Measures Objective Measure 1;Objective Measure 2   Objective Measure 1   Objective Measure TTP   Details R lumbar paraspinals   Objective Measure 2   Objective Measure lumbar   Details WNL, no pain just some mild soreness flex   PT Modalities   PT Modalities Dry Needling   Dry Needling   Dry Needling, Insertion 1-2 Muscles Minutes (34765) 20   Number of tissues 1;2   Tissue - 1 e-stim   Needle Thickness - 1 0.3   Needle Depth - 1 60mm   Needle Count - 1 4   Position - 1 Prone   Technique - 1 e-stim   Tissue - 2 glute med   Needle Thickness - 2 0.3   Needle Depth - 2 75mm   Needle Count - 2 2   Position - 2 Prone   Technique - 2 e-stim   Adverse reactions to treatment No   Patient Response/Progress improved discomfort with lumbar FF   Treatment Interventions (PT)   Interventions Therapeutic Procedure/Exercise;Manual Therapy   Therapeutic Procedure/Exercise   Therapeutic Procedures: strength, endurance, ROM, flexibility minutes (80572) 8   Therapeutic Procedures  Ther Proc 2;Ther Proc 3   Ther Proc 1 Cat Cow   Ther Proc 1 - Details VR   Ther Proc 2 3 way child's pose   Ther Proc 2 - Details VR   Ther Proc 3 Quadruped UE/LE lifts   Ther Proc 3 - Details VR   PTRx Ther Proc 1 Supine piriformis stretch   PTRx Ther Proc 1 - Details VR   PTRx Ther Proc 2 Bridging   PTRx Ther Proc 2 - Details VR   PTRx Ther Proc 3 - Details Goal review, rec-ert completed   PTRx Ther Proc 4 x 5 minutes   PTRx Ther Proc 10 Thoracic mobility sitting   PTRx Ther Proc 10 - Details extension, rotation - VR   Skilled Intervention improve strength/ROM, improve mobility and posture   Patient Response/Progress tolerated well, focus on stretching   Manual Therapy   Manual Therapy: Mobilization, MFR, MLD, friction massage minutes (42361) 12   Manual Therapy Manual Therapy 2;Manual Therapy 3   Manual Therapy 1 STM   Manual Therapy 1 - Details R piriformis, glute med, TFL, QlL and B paraspinals   Manual Therapy 2 Lumbar and lower thoracic PA rotation mobs   Manual Therapy 2 - Details grade II/III, T10-L2   Skilled Intervention reduce pain and muscle stiffness, improve joint mobility   Patient Response/Progress tolerated fairly well, tenderness today along R glute med and TFL   Education   Learner/Method Patient   Plan   Home program PRINT   Plan for next session continue DN as patient finds benefit. progress core and glute exercises.   Total Session Time   Timed Code Treatment Minutes 20   Total Treatment Time (sum of timed and untimed services) 40       [facility]                                                                                   OUTPATIENT PHYSICAL THERAPY    PLAN OF TREATMENT FOR OUTPATIENT REHABILITATION   Patient's Last Name, First Name, Bambi Goode YOB: 1960   Provider's Name   [facility]   Medical Record No.  [id number]     Onset Date: 01/27/25  Start of Care Date: 02/11/25     Medical Diagnosis:  Low  back pain, unspecified back pain laterality, unspecified chronicity, unspecified whether sciatica present      PT Treatment Diagnosis:  Low back pain, unspecified back pain laterality, unspecified chronicity, unspecified whether sciatica present Plan of Treatment  Frequency/Duration: biweekly or once months/ 12 weeks    Certification date from 05/15/25 to 08/07/25         See note for plan of treatment details and functional goals     Elaine Augustin, PT                         I CERTIFY THE NEED FOR THESE SERVICES FURNISHED UNDER        THIS PLAN OF TREATMENT AND WHILE UNDER MY CARE .             Physician Signature               Date    X_____________________________________________________                  Referring Provider:  Milton Parker    Initial Assessment  See Epic Evaluation- Start of Care Date: 02/11/25

## 2025-06-16 ENCOUNTER — THERAPY VISIT (OUTPATIENT)
Dept: PHYSICAL THERAPY | Facility: CLINIC | Age: 65
End: 2025-06-16
Payer: COMMERCIAL

## 2025-06-16 DIAGNOSIS — M54.50 LOW BACK PAIN, UNSPECIFIED BACK PAIN LATERALITY, UNSPECIFIED CHRONICITY, UNSPECIFIED WHETHER SCIATICA PRESENT: Primary | ICD-10-CM

## 2025-06-16 PROCEDURE — 20560 NDL INSJ W/O NJX 1 OR 2 MUSC: CPT | Mod: GZ | Performed by: PHYSICAL THERAPIST

## 2025-06-16 PROCEDURE — 97140 MANUAL THERAPY 1/> REGIONS: CPT | Mod: GP | Performed by: PHYSICAL THERAPIST
